# Patient Record
Sex: FEMALE | Race: OTHER | Employment: UNEMPLOYED | URBAN - METROPOLITAN AREA
[De-identification: names, ages, dates, MRNs, and addresses within clinical notes are randomized per-mention and may not be internally consistent; named-entity substitution may affect disease eponyms.]

---

## 2019-11-25 ENCOUNTER — OFFICE VISIT (OUTPATIENT)
Dept: RHEUMATOLOGY | Age: 54
End: 2019-11-25

## 2019-11-25 VITALS
HEIGHT: 62 IN | WEIGHT: 175 LBS | DIASTOLIC BLOOD PRESSURE: 76 MMHG | SYSTOLIC BLOOD PRESSURE: 124 MMHG | BODY MASS INDEX: 32.2 KG/M2

## 2019-11-25 DIAGNOSIS — Q79.62 EHLERS-DANLOS SYNDROME, BENIGN HYPERMOBILE FORM: Primary | ICD-10-CM

## 2019-11-25 DIAGNOSIS — G89.4 CHRONIC PAIN SYNDROME: ICD-10-CM

## 2019-11-25 PROCEDURE — 99244 OFF/OP CNSLTJ NEW/EST MOD 40: CPT | Performed by: INTERNAL MEDICINE

## 2020-07-09 ENCOUNTER — OFFICE VISIT (OUTPATIENT)
Dept: RHEUMATOLOGY | Age: 55
End: 2020-07-09
Payer: COMMERCIAL

## 2020-07-09 VITALS — WEIGHT: 175.04 LBS | HEIGHT: 62 IN | BODY MASS INDEX: 32.21 KG/M2 | TEMPERATURE: 97.9 F

## 2020-07-09 PROCEDURE — 99213 OFFICE O/P EST LOW 20 MIN: CPT | Performed by: INTERNAL MEDICINE

## 2020-07-09 NOTE — PROGRESS NOTES
interested in pharmacological therapy for chronic pain syndrome, continue with physical reconditioning and conservative measures. She can certainly use Tylenol or NSAIDs over-the-counter as needed for pain control. We also talked about maintaining adequate hydration to help with dizziness and postural hypotension. Patient was made aware that I do not make the decision about disability however will be happy to send our notes after her permission. Follow-up PRN. Patient indicates Understanding and agrees with the management plan. I reviewed patient's history, referral documents and electronic medical records. Copy of consult note is being routed electronically/faxed to referring physician. #######################################################################    [id number] for chronic pain and generalized hypermobility. Interval changes-states that she is applying for disability, therefore made this appointment to make her case strong. She continues to have persistent chronic musculoskeletal discomfort and has subluxation of several finger joints. States that she is not able to work especially manually typing, writing has been difficult because of the weakness in the tendons of the hands. Back pain is persistent and so there is bilateral knee pain. Per patient, it has been difficult even to take care of daily chores because of the physical pain and brain fogginess, she is just unable to perform any meaningful work. She hopes that she is able to get disability. No focal muscle weakness, just has generalized fatigue and weakness. Does not have any inflamed joints in upper or lower extremities. She does have joint hypermobility which is pretty significant in her hands, knees, hips, and spine. She also gets dizzy spells with prolonged standing. Denies any chest pain, shortness of breath, GI/ symptoms. All other ROS are negative.     Past Medical History:   Diagnosis Date    Arthritis 32.01 kg/m²   General appearance/ Psychiatric: well nourished, and well groomed, normal judgement, alert, appears stated age and cooperative. MKS: Her physical examination is unchanged since last visit. [place] neck deformities in almost all fingers bilaterally-reducible. 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 Joint hypermobility noted in her finger joints, wrist, shoulders, hips, knees and ankles. Beighton score for joint hypermobility    Ability to: Left  Right   Passively dorsiflex the fifth metacarpophalangeal joint by at least 90 degrees 1  1   Oppose the thumb to the volar aspect of the ipsilateral forearm 1  1   Hyperextend the elbow by at least 10 degress      Hyperextend the knee by at least 10 degrees 1  1   Place the hands flat on the floor without bending the knees  1      Total score   7/9    >4 or more/ 9 = generalized joint hypermobility. Otherwise, I do not appreciate any focally tender, swollen or inflamed joints in upper or lower extremities. Skin: I do not see any hypertrophic scars or excessive hyper stretchability of her skin. No rashes, no induration or skin thickening or nodules. No evidence ischemia or deformities noted in digits or nails. DATA:         A/P- See above.

## 2020-07-14 ENCOUNTER — HOSPITAL ENCOUNTER (OUTPATIENT)
Dept: NON INVASIVE DIAGNOSTICS | Age: 55
Discharge: HOME | End: 2020-07-14
Payer: COMMERCIAL

## 2020-07-14 LAB
LV EF: 58 %
LVEF MODALITY: NORMAL

## 2020-07-14 PROCEDURE — 93306 TTE W/DOPPLER COMPLETE: CPT

## 2020-07-21 ENCOUNTER — TELEPHONE (OUTPATIENT)
Dept: RHEUMATOLOGY | Age: 55
End: 2020-07-21